# Patient Record
Sex: MALE | Race: WHITE
[De-identification: names, ages, dates, MRNs, and addresses within clinical notes are randomized per-mention and may not be internally consistent; named-entity substitution may affect disease eponyms.]

---

## 2019-03-05 ENCOUNTER — HOSPITAL ENCOUNTER (EMERGENCY)
Dept: HOSPITAL 61 - ER | Age: 24
Discharge: HOME | End: 2019-03-05
Payer: COMMERCIAL

## 2019-03-05 VITALS — WEIGHT: 165 LBS | BODY MASS INDEX: 22.35 KG/M2 | HEIGHT: 72 IN

## 2019-03-05 VITALS — DIASTOLIC BLOOD PRESSURE: 56 MMHG | SYSTOLIC BLOOD PRESSURE: 125 MMHG
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
## 2019-03-05 DIAGNOSIS — S93.401A: Primary | ICD-10-CM
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
## 2019-03-05 PROCEDURE — 73610 X-RAY EXAM OF ANKLE: CPT

## 2019-03-05 PROCEDURE — 99283 EMERGENCY DEPT VISIT LOW MDM: CPT

## 2019-03-05 NOTE — PHYS DOC
Adult General


Chief Complaint


Chief Complaint:  ANKLE PROBLEM





HPI


HPI





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 

that began on Sunday, he states he was walking down some steps at work  and 

rolled his ankle.  He states pain is intermittent, worse on certain movements 

to the ankle. He states he has not taken anything for his pain.


 (DEEPTI PUENTES)





Review of Systems


Review of Systems





Constitutional: Denies fever or chills []


Musculoskeletal: Reports right ankle pain


Integument: Denies rash or skin lesions []


Neurologic: Denies headache, focal weakness or sensory changes []








All other systems were reviewed and found to be within normal limits, except as 

documented in this note.


 (DEEPTI PUENTES)





Allergies


Allergies





Allergies








Coded Allergies Type Severity Reaction Last Updated Verified


 


  No Known Drug Allergies    3/5/19 No





 (NACHO CAMPUZANO DO)





Physical Exam


Physical Exam





Constitutional: Well developed, well nourished, no acute distress, non-toxic 

appearance. []


Skin: Warm, dry, no erythema, no rash. [] 


Back: No tenderness, no CVA tenderness. [] 


Extremities: Right ankle with no obvious deformity, mild soft tissue swelling 

noted on the right lateral ankle. Tenderness on palpation of the right lateral 

ankle, full range of motion to the right ankle and toes. +2 right pedal pulse. 

Cap refill less than 2 seconds the right toes.


Neurologic: Alert and oriented X 3, normal motor function, normal sensory 

function, no focal deficits noted. []


Psychologic: Affect normal, judgement normal, mood normal. []


 (DEEPTI PUENTES)





Current Patient Data


Vital Signs





 Vital Signs








  Date Time  Temp Pulse Resp B/P (MAP) Pulse Ox O2 Delivery O2 Flow Rate FiO2


 


3/5/19 18:15 98.3 82 16 125/56 (79) 99 Room Air  





 98.3       





 (NACHO CAMPUZANO DO)





EKG


EKG


[]


 (DEEPTI PUENTES)





Radiology/Procedures


Radiology/Procedures


[]


 (DEEPTI PUENTES)


Radiology/Procedures


PROCEDURE: ANKLE RIGHT 3V





ANKLE RIGHT 3V


 


History: TWISTED ANKLE TWO DAYS AGO ON THE STEPS, PAIN ON THE MEDIAL SIDE


 


Comparison: None.


 


Findings:


3 views right ankle are submitted. There is soft tissue swelling. No acute


fracture or dislocation is identified.


 


Impression: 


 


1.  No acute osseous abnormality is identified. There is soft tissue 


swelling.


 


Electronically signed by: Marcos Mehta MD (3/5/2019 7:41 PM) [facility]


 (NACHO CAMPUZANO DO)





Course & Med Decision Making


Course & Med Decision Making


Pertinent Labs and Imaging studies reviewed. (See chart for details)





This is a 23-year-old male patient presenting to the ED today with right ankle 

pain, right ankle x-rays interpreted by Dr. Campuzano are negative for any acute 

findings. Patient was provided Ace bandage in the ED which he applied himself. 

Neurovascular exam post-Ace wrap application is normal. Ice and elevation 

encouraged. OTC pain relievers. Follow-up workmancomp.


 (DEEPTI PUENTES)





Dragon Disclaimer


Dragon Disclaimer


This electronic medical record was generated, in whole or in part, using a 

voice recognition dictation system.


 (DEEPTI PUENTES)





Departure


Departure


Impression:  


 Primary Impression:  


 Right ankle sprain


Disposition:  01 HOME, SELF-CARE


Condition:  STABLE


Referrals:  


NO PCP (PCP)








SARATH RUIZ II, MD


Follow up with the workman comp doctor or the provided orthopedic doctor in one 

week


Patient Instructions:  Ankle Sprain, Acute, with Phase I Rehab-SportsMed





Additional Instructions:  


You were evaluated in the emergency room for right ankle sprain. Ice and 

elevate the extremity. Use the Ace bandage provided as needed and tolerated. 

Take over-the-counter pain relievers as needed. Follow-up with the [employer] comp 

doctor or the provided orthopedic doctor in 1-2 weeks.





Attending Signature


Attending Signature


I have reviewed the PA/NP's note and plan of care. I was available for 

consultation as needed during the patient's visit in the emergency department. 

I agree with the clinical impression, plan, and disposition.


 (NACHO CAMPUZANO DO)





Problem Qualifiers








 Primary Impression:  


 Right ankle sprain


 Encounter type:  initial encounter  Involved ligament of ankle:  unspecified 

ligament  Qualified Codes:  S93.401A - Sprain of unspecified ligament of right 

ankle, initial encounter








DEEPTI PUENTES Mar 5, 2019 19:27


NACHO CAMPUZANO DO Mar 6, 2019 05:16

## 2019-03-05 NOTE — RAD
ANKLE RIGHT 3V

 

History: TWISTED ANKLE TWO DAYS AGO ON THE STEPS, PAIN ON THE MEDIAL SIDE

 

Comparison: None.

 

Findings:

3 views right ankle are submitted. There is soft tissue swelling. No acute

fracture or dislocation is identified.

 

Impression: 

 

1.  No acute osseous abnormality is identified. There is soft tissue 

swelling.

 

Electronically signed by: Marcos Mehta MD (3/5/2019 7:41 PM) [facility]